# Patient Record
Sex: MALE
[De-identification: names, ages, dates, MRNs, and addresses within clinical notes are randomized per-mention and may not be internally consistent; named-entity substitution may affect disease eponyms.]

---

## 2019-02-26 ENCOUNTER — HOSPITAL ENCOUNTER (OUTPATIENT)
Dept: HOSPITAL 80 - FSGY | Age: 69
Discharge: HOME | End: 2019-02-26
Attending: INTERNAL MEDICINE
Payer: COMMERCIAL

## 2019-02-26 VITALS — DIASTOLIC BLOOD PRESSURE: 82 MMHG | SYSTOLIC BLOOD PRESSURE: 141 MMHG

## 2019-02-26 DIAGNOSIS — Z86.010: Primary | ICD-10-CM

## 2019-02-26 DIAGNOSIS — D12.3: ICD-10-CM

## 2019-02-26 DIAGNOSIS — D12.2: ICD-10-CM

## 2019-02-26 DIAGNOSIS — D12.0: ICD-10-CM

## 2019-02-26 NOTE — PDGENHP
History & Physical


Chief Complaint: phx large polyp removed one year ago


History of Present Illness: large flat right sided polyp


Pertinent Past, Social, Family History: no tobacco.  occ alcohol.  fhx - no cc.

  hypothyroid


Relevant Physical Exam: A+Ox3.  CTA.  S1S2.  +BS, soft nt


Cardiorespiratory Assessment: class 2

## 2019-02-26 NOTE — PDPROPOC
Sedation Plan of Care


Sedation Plan of Care: mental status noted, patient educated of risks, benefits

, alternatives, patient can tolerate sedation


ASA Classification: ASA 2


Planned drugs: fentanyl, midazolam


Mallampati Score: Class 1


Mallampati Reference Image: 





Patient passed 3-3-2 rule?: Yes

## 2019-02-26 NOTE — GIREPORT
[facility]

Surgical Services - Endoscopy Department

_____________________________________________________________________________________________________
_______

Patient Name: Justino Townsend                       Procedure Date: 2/26/2019 4:42 PM

MRN: [id number]                                       Account Number: [id number]

Patient Type: Outpatient                             Attending  MD/ ER Physician: Gume Adams MD

_____________________________________________________________________________________________________
_______

 

Procedure:                    Colonoscopy

Indications:                  Surveillance: Personal history of piecemeal removal of large sessile ad
enoma 

                              on last colonoscopy (less than 1 year ago)

Providers:                    Gume Adams MD

Referring MD:                 Jarad Milan MD

Medicines:                    Fentanyl 100 micrograms IV, Midazolam 6 mg IV

Complications:                No immediate complications. Estimated blood loss: Minimal.

Description of Procedure:     After obtaining informed consent, the scope was passed under direct vis
ion. 

                              Throughout the procedure, the patient's blood pressure, pulse, and oxyg
en 

                              saturations were monitored continuously. The Colonoscope was introduced
 

                              through the anus and advanced to the terminal ileum, with identificatio
n of 

                              the appendiceal orifice and IC valve. The colonoscopy was performed wit
hout 

                              difficulty. The patient tolerated the procedure well. The quality of th
e 

                              bowel preparation was good.

Findings:                     The digital rectal exam was normal.

                              The terminal ileum appeared normal.

                              A 7 mm polyp was found in the cecum. The polyp was semi-sessile. The po
lyp 

                              was removed with a cold snare. Resection and retrieval were complete. 

                              Estimated blood loss was minimal.

                              A post polypectomy scar was found in the proximal ascending colon. Ther
e was 

                              residual polyp tissue. Biopsies were taken with a cold forceps for 

                              histology. Estimated blood loss was minimal. Fulguration to ablate the 


                              lesion remnants by argon plasma at 0.5 liters/minute and 20 [place] was 

                              successful. Estimated blood loss was minimal.

                              A 3 mm polyp was found in the splenic flexure. The polyp was sessile. T
he 

                              polyp was removed with a piecemeal technique using a cold biopsy forcep
s. 

                              Resection and retrieval were complete. Estimated blood loss was minimal
.

                              Many medium-mouthed diverticula were found in the sigmoid colon and 

                              descending colon.

                              Non-bleeding internal hemorrhoids were found during retroflexion.

                              The exam was otherwise without abnormality.

Estimated Blood Loss:         Estimated blood loss was minimal.

Post Op Diagnosis:            - The examined portion of the ileum was normal.

                              - One 7 mm polyp in the cecum, removed with a cold snare. Resected and 


                              retrieved.

                              - Post-polypectomy scar in the proximal ascending colon. Biopsied. Abby
niranjan 

                              with argon plasma coagulation (APC).

                              - One 3 mm polyp at the splenic flexure, removed piecemeal using a cold
 

                              biopsy forceps. Resected and retrieved.

                              - Diverticulosis in the sigmoid colon and in the descending colon.

                              - Non-bleeding internal hemorrhoids.

                              - The examination was otherwise normal.

Recommendation:               - Await pathology results.

                              - My office will call with the pathology result with 5-7 days. If you h
ave 

                              not heard from my office by 12-14, do not assume the pathology is rolando
l, 

                              please call [phone] to get the pathology results.

                              - Repeat colonoscopy date to be determined after pending pathology resu
lts 

                              are reviewed for surveillance based on pathology results. If previous 

                              polypectomy site is without adenomatous tissue then the interval is 3 y
ears.

                              - High fiber diet indefinitely.

                              - 30-35 grams of dietary fiber per day. Can use supplemental fiber.

                              - A high fiber diet may decrease risk of complications from diverticulo
sis. 

                              There is no need to avoid seeds or nuts.

                              - Patient has a contact number available for emergencies. The signs and
 

                              symptoms of potential delayed complications were discussed with the pat
ient. 

                              Return to normal activities tomorrow. Written discharge instructions we
re 

                              provided to the patient.

                              - Continue present medications.

                              - Avoid Aspirin and NSAIDs for 7-10 days except as used for cardiac or 


                              stroke prevention.

                              - Discharge patient to home (ambulatory).

                              - Return to primary care physician as previously scheduled.

                              - Thank you for allowing me to help in your patient's care. Do not hesi
garcia 

                              to call with any questions.

Attending Participation:

     I personally performed the entire procedure.

 

Bryan Damon M.D

____________________

Gume Adams MD

2/26/2019 5:53:59 PM

This report has been signed electronicallyMatthew MD Bryan

Number of Addenda: 0

 

Note Initiated On: 2/26/2019 4:42 PM

Total Procedure Duration Time 0 hours 23 minutes 36 seconds 

[email or web address]